# Patient Record
Sex: FEMALE | Race: WHITE | NOT HISPANIC OR LATINO | Employment: FULL TIME
[De-identification: names, ages, dates, MRNs, and addresses within clinical notes are randomized per-mention and may not be internally consistent; named-entity substitution may affect disease eponyms.]

---

## 2017-07-08 ENCOUNTER — HEALTH MAINTENANCE LETTER (OUTPATIENT)
Age: 57
End: 2017-07-08

## 2022-12-01 ENCOUNTER — TELEPHONE (OUTPATIENT)
Dept: OBGYN | Facility: CLINIC | Age: 62
End: 2022-12-01

## 2022-12-01 ENCOUNTER — LAB (OUTPATIENT)
Dept: LAB | Facility: CLINIC | Age: 62
End: 2022-12-01

## 2022-12-01 DIAGNOSIS — R10.11 RIGHT UPPER QUADRANT PAIN: ICD-10-CM

## 2022-12-01 DIAGNOSIS — R10.11 RIGHT UPPER QUADRANT PAIN: Primary | ICD-10-CM

## 2022-12-01 LAB
ALBUMIN SERPL BCG-MCNC: 4.2 G/DL (ref 3.5–5.2)
ALP SERPL-CCNC: 54 U/L (ref 35–104)
ALT SERPL W P-5'-P-CCNC: 12 U/L (ref 10–35)
AMYLASE SERPL-CCNC: 37 U/L (ref 28–100)
ANION GAP SERPL CALCULATED.3IONS-SCNC: 13 MMOL/L (ref 7–15)
AST SERPL W P-5'-P-CCNC: 24 U/L (ref 10–35)
BASOPHILS # BLD AUTO: 0 10E3/UL (ref 0–0.2)
BASOPHILS NFR BLD AUTO: 0 %
BILIRUB SERPL-MCNC: 0.2 MG/DL
BUN SERPL-MCNC: 22.1 MG/DL (ref 8–23)
CALCIUM SERPL-MCNC: 9.1 MG/DL (ref 8.8–10.2)
CHLORIDE SERPL-SCNC: 105 MMOL/L (ref 98–107)
CREAT SERPL-MCNC: 0.71 MG/DL (ref 0.51–0.95)
DEPRECATED HCO3 PLAS-SCNC: 24 MMOL/L (ref 22–29)
EOSINOPHIL # BLD AUTO: 0.2 10E3/UL (ref 0–0.7)
EOSINOPHIL NFR BLD AUTO: 3 %
ERYTHROCYTE [DISTWIDTH] IN BLOOD BY AUTOMATED COUNT: 13.6 % (ref 10–15)
GFR SERPL CREATININE-BSD FRML MDRD: >90 ML/MIN/1.73M2
GLUCOSE SERPL-MCNC: 76 MG/DL (ref 70–99)
HCT VFR BLD AUTO: 40.7 % (ref 35–47)
HGB BLD-MCNC: 13.3 G/DL (ref 11.7–15.7)
LYMPHOCYTES # BLD AUTO: 1.6 10E3/UL (ref 0.8–5.3)
LYMPHOCYTES NFR BLD AUTO: 29 %
MCH RBC QN AUTO: 30.2 PG (ref 26.5–33)
MCHC RBC AUTO-ENTMCNC: 32.7 G/DL (ref 31.5–36.5)
MCV RBC AUTO: 93 FL (ref 78–100)
MONOCYTES # BLD AUTO: 0.6 10E3/UL (ref 0–1.3)
MONOCYTES NFR BLD AUTO: 10 %
NEUTROPHILS # BLD AUTO: 3.2 10E3/UL (ref 1.6–8.3)
NEUTROPHILS NFR BLD AUTO: 58 %
PLATELET # BLD AUTO: 272 10E3/UL (ref 150–450)
POTASSIUM SERPL-SCNC: 3.7 MMOL/L (ref 3.4–5.3)
PROT SERPL-MCNC: 6.6 G/DL (ref 6.4–8.3)
RBC # BLD AUTO: 4.4 10E6/UL (ref 3.8–5.2)
SODIUM SERPL-SCNC: 142 MMOL/L (ref 136–145)
WBC # BLD AUTO: 5.5 10E3/UL (ref 4–11)

## 2022-12-01 PROCEDURE — 82150 ASSAY OF AMYLASE: CPT

## 2022-12-01 PROCEDURE — 80053 COMPREHEN METABOLIC PANEL: CPT

## 2022-12-01 PROCEDURE — 85025 COMPLETE CBC W/AUTO DIFF WBC: CPT

## 2022-12-01 PROCEDURE — 36415 COLL VENOUS BLD VENIPUNCTURE: CPT

## 2022-12-01 NOTE — TELEPHONE ENCOUNTER
I spoke to the patient last evening regarding her abdominal pain.  I did send a prescription for Augmentin 875 mg orally twice daily for 7 days to the [facility] pharmacy last filled earlier this week.  The patient states that her pain is now more epigastric right upper quadrant and radiating into her right area not associated with fevers chills food intolerance or changes in bowel or bladder function or other symptoms to suggest GERD.  The patient's had a previous cholecystectomy done.  I am going to place a future order for a comprehensive metabolic panel, serum amylase and CBC with differential that she can have drawn today and I will see her on Friday morning 12/2/2022 to review these labs and develop an appropriate therapy plan

## 2022-12-01 NOTE — TELEPHONE ENCOUNTER
"Routing comment from Dr Tellez.    \"Mike can you please put her on our schedule in [facility] Friday morning 12/2/2022 at 815   Thank you \"      Appt scheduled.  Maribell Aquino RN        "

## 2022-12-02 ENCOUNTER — OFFICE VISIT (OUTPATIENT)
Dept: OBGYN | Facility: CLINIC | Age: 62
End: 2022-12-02
Payer: COMMERCIAL

## 2022-12-02 VITALS
SYSTOLIC BLOOD PRESSURE: 128 MMHG | DIASTOLIC BLOOD PRESSURE: 76 MMHG | BODY MASS INDEX: 32.07 KG/M2 | WEIGHT: 181 LBS | HEART RATE: 76 BPM | HEIGHT: 63 IN

## 2022-12-02 DIAGNOSIS — Z12.11 COLON CANCER SCREENING: Primary | ICD-10-CM

## 2022-12-02 DIAGNOSIS — R10.11 RIGHT UPPER QUADRANT PAIN: ICD-10-CM

## 2022-12-02 PROCEDURE — 99203 OFFICE O/P NEW LOW 30 MIN: CPT | Performed by: OBSTETRICS & GYNECOLOGY

## 2022-12-02 NOTE — PATIENT INSTRUCTIONS
"You can reach your [facility] Care Team any time of the day by calling [phone]. This number will put you in touch with the 24 hour nurse line if the clinic is closed.    To contact your OB/GYN Station Coordinator/Surgery Scheduler please call [phone]. This is a direct number for your care team between 8 a.m. and 4 p.m. Monday through Friday.    [facility] is open for your convenience:  Monday through Friday 8 a.m. to 6 p.m.  Closed weekends and all major holidays.  What lifestyle changes can I make to help improve my cholesterol levels?    Exercise regularly.  Exercise can raise HDL cholesterol levels and reduce levels of LDL cholesterol and triglycerides. If you haven't been exercising, try to work up to 30 minutes, 4 to 6 times a week.    Lose weight if you are overweight.  Being overweight can raise your cholesterol levels. Losing weight, even just 5 or 10 pounds, can lower your total cholesterol, LDL cholesterol and triglyceride levels.    Eat a heart-healthy diet.  Eat plenty of fresh fruits and vegetables. Fruits and vegetables are naturally low in fat. Not only do they add flavor and variety to your diet, but they are also the best source of fiber, vitamins and minerals for your body. Aim for 5 cups of fruits and vegetables every day, not counting potatoes, corn and rice. Potatoes, corn and rice count as carbohydrates.     Pick \"good\" fats over \"bad\" fats. Fat is part of a healthy diet, but you need to know the difference between \"bad\" fats and \"good\" fats. \"Bad\" fats, such as saturated and trans fats, are found in foods such as butter; coconut and palm oil; saturated or partially hydrogenated vegetable fats such as shortening and margarine; animal fats in meats; and fats in whole milk dairy products. Limit the amount of saturated fat in your diet, and avoid trans fat completely. Unsaturated fat is the \"good\" fat. Most fats in fish, vegetables, grains and tree nuts are unsaturated. Try to eat " "unsaturated fat in place of saturated fat. For example, you can use olive oil or canola oil in cooking instead of butter.     Use healthier cooking methods. Baking, broiling and roasting are the healthiest ways to prepare meat, poultry and other foods. Trim any outside fat or skin before cooking. Lean cuts can be pan-broiled or stir-fried. Use either a nonstick pan or nonstick cooking spray instead of adding fats such as butter or margarine. When eating out, ask how food is prepared. You can request that your food be baked, broiled or roasted, rather than fried.   Look for other sources of protein. Fish, dry beans, tree nuts, peas and lentils offer protein, nutrients and fiber without the cholesterol and saturated fats that meats have. Consider eating one \"meatless\" meal each week. Try substituting beans for meat in a favorite recipe, such as lasagna or chili. Snack on a handful of almonds or pecans. Soy is also an excellent source of protein. Good examples of soy include soy milk, edamame (green soy beans), tofu and soy protein shakes.     Get more fiber in your diet. Add good sources of fiber to your meals. Examples include fruits, vegetables, whole grains (such as oat bran, whole and rolled oats and barley), legumes (such as beans and peas) and nuts and seeds (such as ground flax seed). In addition to fiber, whole grains supply B-vitamins and important nutrients not found in foods made with white flour.     Eat more fish. Fish are an excellent source of omega-3 fatty acids. Wild-caught oily fish, such as salmon, tuna, mackerel and sardines, are the best sources of omega-3s, but all fish contain some amount of this beneficial fatty acid. Aim for 2 6-oz servings each week.       "

## 2022-12-04 NOTE — PROGRESS NOTES
HPI:  Kacey Rust is a 61 year old white female  No LMP recorded. Patient has had a hysterectomy.  Postmenopausal not on HRT, who presents for evaluation of a several week history of epigastric and right upper quadrant pain that is been radiating into her upper right back area.  The patient states that she had been using Excedrin fairly frequently for joint aches and pains and stopped it about a week ago and has not really noticed a significant difference in her discomfort.  She also wears an underwire bra and notices some discomfort under the left breast but this pain is different.  The patient states that she has a history of irritable bowel syndrome and recently was given a prescription for Augmentin 875 mg p.o. twice daily for 7 days for the discomfort.  The patient said that it is perhaps made slight improvement in her pain.  She has not taken any other medication for discomfort.  She used Pepcid as needed and notes some improvement of her discomfort.  Most recently she rates her pain as a level 4 out of 10 and has not taken any other medication for it.  She does not notice any particular food intolerance but does notice a soda pop makes the pain worse.  She denies fevers or chills vomiting but does have some mild nausea.  No other bowel or bladder symptoms.  The patient had a mammogram done in  of this year that was normal.  She has had a previous cholecystectomy done.  She is due for colon cancer screening.    Past Medical History:   Diagnosis Date     Irritable bowel syndrome     ceretain foods bother her     Past Surgical History:   Procedure Laterality Date     ANKLE FRACTURE SURGERY Left 2009     HYSTERECTOMY, PAP NO LONGER INDICATED       LAPAROSCOPIC CHOLECYSTECTOMY  2002    post op nausea     LUMPECTOMY BREAST Left     clogged milk gland while breast feeding w 2nd child     ROTATOR CUFF REPAIR RT/LT Right     Dr Bowden     wisdom teeth       Family History   Problem Relation Age of  "Onset     Breast Cancer Mother      Coronary Artery Disease Paternal Grandmother      Other Cancer Maternal Grandmother         Stomach, possibly     Cerebrovascular Disease Maternal Grandfather      Social History     Socioeconomic History     Marital status:      Spouse name: Not on file     Number of children: Not on file     Years of education: Not on file     Highest education level: Not on file   Occupational History     Not on file   Tobacco Use     Smoking status: Never     Smokeless tobacco: Never   Vaping Use     Vaping Use: Never used   Substance and Sexual Activity     Alcohol use: No     Alcohol/week: 0.0 standard drinks     Drug use: No     Sexual activity: Yes     Partners: Male     Comment: hysterectomy   Other Topics Concern     Not on file   Social History Narrative     Not on file     Social Determinants of Health     Financial Resource Strain: Not on file   Food Insecurity: Not on file   Transportation Needs: Not on file   Physical Activity: Not on file   Stress: Not on file   Social Connections: Not on file   Intimate Partner Violence: Not on file   Housing Stability: Not on file       Allergies:  Ciprofloxacin and Toradol [ketorolac tromethamine]    Current Outpatient Medications   Medication Sig Dispense Refill     omeprazole 20 MG tablet Take 1 tablet (20 mg) by mouth daily Take 30-60 minutes before a meal. 90 tablet 1       Review Of Systems   ROS: 10 point ROS neg other than the symptoms noted above in the HPI.    Exam:  /76   Pulse 76   Ht 1.6 m (5' 3\")   Wt 82.1 kg (181 lb)   BMI 32.06 kg/m    {Constitutional: healthy, alert and mild distress  Head: Normocephalic. No masses, lesions, tenderness or abnormalities  Neck: Neck supple. No adenopathy. Thyroid symmetric, normal size,, Carotids without bruits.  Cardiovascular: negative, PMI normal. No lifts, heaves, or thrills. RRR. No murmurs, clicks gallops or rub  Respiratory: negative, Percussion normal. Good diaphragmatic " excursion. Lungs clear  Gastrointestinal: Abdomen soft, mildly-tender in the epigastric and right upper quadrant area. BS normal. No masses, organomegaly    Assessment/Plan:  (Z12.11) Colon cancer screening  (primary encounter diagnosis)  Comment: Recommendations reviewed options for testing reviewed  Plan: Adult GI  Referral - Procedure Only        Was given referral for colonoscopy    (R10.11) Right upper quadrant pain  Comment: I reviewed the results of her lab testing including a normal comprehensive metabolic panel CBC and amylase level.  I believe that her discomfort is GERD related  Plan: I will have her begin omeprazole 20 mg daily with a bland diet and will observe over the next 7 to 14 days.  If there is no improvement further testing and evaluation will be undertaken.  I gave the patient a detailed written outline of our plan      Jayjay Tellez M.D.

## 2022-12-18 ENCOUNTER — HEALTH MAINTENANCE LETTER (OUTPATIENT)
Age: 62
End: 2022-12-18

## 2023-04-02 DIAGNOSIS — J98.01 BRONCHOSPASM: Primary | ICD-10-CM

## 2023-04-03 RX ORDER — ALBUTEROL SULFATE 90 UG/1
AEROSOL, METERED RESPIRATORY (INHALATION)
Qty: 8.5 G | Refills: 0 | Status: SHIPPED | OUTPATIENT
Start: 2023-04-03

## 2023-04-03 NOTE — TELEPHONE ENCOUNTER
Routing refill request to provider for review/approval because:  Drug not active on patient's medication list  Maribell Aquino RN

## 2023-05-01 ENCOUNTER — TELEPHONE (OUTPATIENT)
Dept: OBGYN | Facility: CLINIC | Age: 63
End: 2023-05-01

## 2023-05-01 DIAGNOSIS — K57.32 DIVERTICULITIS OF COLON: Primary | ICD-10-CM

## 2023-05-01 NOTE — TELEPHONE ENCOUNTER
Patient called and is having left lower quadrant discomfort loose stools and symptoms consistent with diverticulitis.  In the past the patient's responded well to Augmentin 875 mg p.o. twice daily for 7 days.  I sent a refill for this and to her listed pharmacy

## 2024-01-27 ENCOUNTER — HEALTH MAINTENANCE LETTER (OUTPATIENT)
Age: 64
End: 2024-01-27

## 2024-12-29 ENCOUNTER — HEALTH MAINTENANCE LETTER (OUTPATIENT)
Age: 64
End: 2024-12-29

## 2025-02-01 ENCOUNTER — HEALTH MAINTENANCE LETTER (OUTPATIENT)
Age: 65
End: 2025-02-01

## 2025-07-17 ENCOUNTER — HOSPITAL ENCOUNTER (EMERGENCY)
Age: 65
Discharge: HOME | End: 2025-07-17
Payer: MEDICARE

## 2025-07-17 VITALS
SYSTOLIC BLOOD PRESSURE: 130 MMHG | OXYGEN SATURATION: 94 % | DIASTOLIC BLOOD PRESSURE: 86 MMHG | HEART RATE: 73 BPM | TEMPERATURE: 97.88 F | RESPIRATION RATE: 18 BRPM

## 2025-07-17 VITALS — BODY MASS INDEX: 33.8 KG/M2

## 2025-07-17 DIAGNOSIS — S39.011A: ICD-10-CM

## 2025-07-17 DIAGNOSIS — K44.9: Primary | ICD-10-CM

## 2025-07-17 LAB
ALP SERPL-CCNC: 53 U/L (ref 40–150)
ANION GAP: 6 MEQ/L (ref 7–15)
BASOPHILS ABSOLUTE AUTO: 0 K/UL (ref 0–0.3)
BASOPHILS PERCENT AUTO: 0.5 % (ref 0–3)
BILIRUB DIRECT SERPL-MCNC: 0 MG/DL (ref 0–0.5)
CREAT CL PREDICTED SERPL C-G-VRATE: 45.78 ML/MIN
EOSINOPHILS ABSOLUTE AUTO: 0 K/UL (ref 0–0.5)
EOSINOPHILS PERCENT AUTO: 1.2 % (ref 0–7)
ESTIMATED GLOMERULAR FILT RATE: 99 ML/MIN
HEMATOCRIT: 43.1 % (ref 33–51)
IMMATURE GRANULOCYTES ABS AUTO: 0 K/UL (ref 0–0.3)
IMMATURE GRANULOCYTES PCT AUTO: 0 %
LIPASE*: 60 U/L (ref 23–300)
LYMPHOCYTES  ABSOLUTE AUTO: 1.2 K/UL (ref 0.9–2.9)
LYMPHOCYTES PERCENT AUTO: 28.7 % (ref 20–44)
Lab: 0.5 MG/DL (ref 0.1–1.5)
Lab: 0.6 MG/DL (ref 0.5–1.5)
Lab: 106 MMOL/L (ref 96–114)
Lab: 13 % (ref 11.5–15.5)
Lab: 13.9 GM/DL (ref 12–16)
Lab: 140 MMOL/L (ref 135–149)
Lab: 16 MG/DL (ref 7–30)
Lab: 16 U/L (ref 4–35)
Lab: 27 U/L (ref 12–35)
Lab: 28 MMOL/L (ref 20–32)
Lab: 3.1 MMOL/L (ref 3.6–5.1)
Lab: 4.04 K/UL (ref 4.5–11)
Lab: 4.2 G/DL (ref 3.3–5)
Lab: 9.1 MG/DL (ref 8.4–10.6)
Lab: 94 MG/DL (ref 60–115)
Lab: < 0.5 MG/DL (ref 0.5–1)
Lab: NO
MEAN CORPUSCULAR HEMOGLOBIN: 29 PG (ref 26–34)
MEAN CORPUSCULAR HGB CONC: 32 GM/DL (ref 32–36)
MEAN CORPUSCULAR VOLUME: 91 FL (ref 80–100)
MONOCYTES ABSOLUTE AUTO: 0.4 K/UL (ref 0–0.9)
MONOCYTES PERCENT AUTO: 9.2 % (ref 0–11)
NEUTROPHILS ABSOLUTE AUTO: 2.4 K/UL (ref 1.7–7)
NEUTROPHILS PERCENT AUTO: 60.4 % (ref 42–72)
PLATELET COUNT*: 239 K/UL (ref 140–440)
PROT SERPL-MCNC: 7 G/DL (ref 6–8.3)
RED BLOOD COUNT: 4.73 M/UL (ref 4–5.2)

## 2025-07-17 PROCEDURE — 99284 EMERGENCY DEPT VISIT MOD MDM: CPT

## 2025-07-17 PROCEDURE — 83690 ASSAY OF LIPASE: CPT

## 2025-07-17 PROCEDURE — 74177 CT ABD & PELVIS W/CONTRAST: CPT

## 2025-07-17 PROCEDURE — A9270 NON-COVERED ITEM OR SERVICE: HCPCS

## 2025-07-17 PROCEDURE — 36415 COLL VENOUS BLD VENIPUNCTURE: CPT

## 2025-07-17 PROCEDURE — 99285 EMERGENCY DEPT VISIT HI MDM: CPT

## 2025-07-17 PROCEDURE — 80048 BASIC METABOLIC PNL TOTAL CA: CPT

## 2025-07-17 PROCEDURE — 86140 C-REACTIVE PROTEIN: CPT

## 2025-07-17 PROCEDURE — 80076 HEPATIC FUNCTION PANEL: CPT

## 2025-07-17 PROCEDURE — 85025 COMPLETE CBC W/AUTO DIFF WBC: CPT
